# Patient Record
Sex: FEMALE | Race: WHITE | Employment: FULL TIME | URBAN - METROPOLITAN AREA
[De-identification: names, ages, dates, MRNs, and addresses within clinical notes are randomized per-mention and may not be internally consistent; named-entity substitution may affect disease eponyms.]

---

## 2023-01-04 ENCOUNTER — ANESTHESIA EVENT (OUTPATIENT)
Dept: SURGERY | Age: 18
End: 2023-01-04
Payer: COMMERCIAL

## 2023-01-05 ENCOUNTER — HOSPITAL ENCOUNTER (OUTPATIENT)
Age: 18
Setting detail: OUTPATIENT SURGERY
Discharge: HOME | End: 2023-01-05
Attending: SPECIALIST | Admitting: SPECIALIST
Payer: COMMERCIAL

## 2023-01-05 ENCOUNTER — ANESTHESIA (OUTPATIENT)
Dept: SURGERY | Age: 18
End: 2023-01-05
Payer: COMMERCIAL

## 2023-01-05 VITALS
DIASTOLIC BLOOD PRESSURE: 71 MMHG | OXYGEN SATURATION: 96 % | BODY MASS INDEX: 28.27 KG/M2 | RESPIRATION RATE: 13 BRPM | HEIGHT: 64 IN | TEMPERATURE: 97.8 F | HEART RATE: 97 BPM | WEIGHT: 165.57 LBS | SYSTOLIC BLOOD PRESSURE: 109 MMHG

## 2023-01-05 DIAGNOSIS — J35.01 CHRONIC TONSILLITIS: Primary | ICD-10-CM

## 2023-01-05 LAB — HCG UR QL: NEGATIVE

## 2023-01-05 PROCEDURE — 77030040361 HC SLV COMPR DVT MDII -B

## 2023-01-05 PROCEDURE — 77030026438 HC STYL ET INTUB CARD -A: Performed by: ANESTHESIOLOGY

## 2023-01-05 PROCEDURE — 74011000250 HC RX REV CODE- 250: Performed by: NURSE ANESTHETIST, CERTIFIED REGISTERED

## 2023-01-05 PROCEDURE — 2709999900 HC NON-CHARGEABLE SUPPLY: Performed by: SPECIALIST

## 2023-01-05 PROCEDURE — 74011000250 HC RX REV CODE- 250: Performed by: SPECIALIST

## 2023-01-05 PROCEDURE — 76060000062 HC AMB SURG ANES 1 TO 1.5 HR: Performed by: SPECIALIST

## 2023-01-05 PROCEDURE — 81025 URINE PREGNANCY TEST: CPT

## 2023-01-05 PROCEDURE — 76210000040 HC AMBSU PH I REC FIRST 0.5 HR: Performed by: SPECIALIST

## 2023-01-05 PROCEDURE — 74011250636 HC RX REV CODE- 250/636: Performed by: ANESTHESIOLOGY

## 2023-01-05 PROCEDURE — 74011250637 HC RX REV CODE- 250/637: Performed by: ANESTHESIOLOGY

## 2023-01-05 PROCEDURE — 77030040922 HC BLNKT HYPOTHRM STRY -A

## 2023-01-05 PROCEDURE — 76210000057 HC AMBSU PH II REC 1 TO 1.5 HR: Performed by: SPECIALIST

## 2023-01-05 PROCEDURE — 77030013079 HC BLNKT BAIR HGGR 3M -A: Performed by: ANESTHESIOLOGY

## 2023-01-05 PROCEDURE — 76030000001 HC AMB SURG OR TIME 1 TO 1.5: Performed by: SPECIALIST

## 2023-01-05 PROCEDURE — 74011250636 HC RX REV CODE- 250/636: Performed by: NURSE ANESTHETIST, CERTIFIED REGISTERED

## 2023-01-05 PROCEDURE — 77030014153 HC WND COBLATN ENT S&N -C: Performed by: SPECIALIST

## 2023-01-05 PROCEDURE — 77030008683 HC TU ET CUF COVD -A: Performed by: ANESTHESIOLOGY

## 2023-01-05 RX ORDER — SUCCINYLCHOLINE CHLORIDE 20 MG/ML
INJECTION INTRAMUSCULAR; INTRAVENOUS AS NEEDED
Status: DISCONTINUED | OUTPATIENT
Start: 2023-01-05 | End: 2023-01-05 | Stop reason: HOSPADM

## 2023-01-05 RX ORDER — ROCURONIUM BROMIDE 10 MG/ML
INJECTION, SOLUTION INTRAVENOUS AS NEEDED
Status: DISCONTINUED | OUTPATIENT
Start: 2023-01-05 | End: 2023-01-05 | Stop reason: HOSPADM

## 2023-01-05 RX ORDER — ONDANSETRON 2 MG/ML
INJECTION INTRAMUSCULAR; INTRAVENOUS AS NEEDED
Status: DISCONTINUED | OUTPATIENT
Start: 2023-01-05 | End: 2023-01-05 | Stop reason: HOSPADM

## 2023-01-05 RX ORDER — DEXAMETHASONE SODIUM PHOSPHATE 4 MG/ML
INJECTION, SOLUTION INTRA-ARTICULAR; INTRALESIONAL; INTRAMUSCULAR; INTRAVENOUS; SOFT TISSUE AS NEEDED
Status: DISCONTINUED | OUTPATIENT
Start: 2023-01-05 | End: 2023-01-05 | Stop reason: HOSPADM

## 2023-01-05 RX ORDER — MIDAZOLAM HYDROCHLORIDE 1 MG/ML
INJECTION, SOLUTION INTRAMUSCULAR; INTRAVENOUS AS NEEDED
Status: DISCONTINUED | OUTPATIENT
Start: 2023-01-05 | End: 2023-01-05 | Stop reason: HOSPADM

## 2023-01-05 RX ORDER — SCOLOPAMINE TRANSDERMAL SYSTEM 1 MG/1
1 PATCH, EXTENDED RELEASE TRANSDERMAL
Status: DISCONTINUED | OUTPATIENT
Start: 2023-01-05 | End: 2023-01-05 | Stop reason: HOSPADM

## 2023-01-05 RX ORDER — SODIUM CHLORIDE, SODIUM LACTATE, POTASSIUM CHLORIDE, CALCIUM CHLORIDE 600; 310; 30; 20 MG/100ML; MG/100ML; MG/100ML; MG/100ML
125 INJECTION, SOLUTION INTRAVENOUS CONTINUOUS
Status: DISCONTINUED | OUTPATIENT
Start: 2023-01-05 | End: 2023-01-05 | Stop reason: HOSPADM

## 2023-01-05 RX ORDER — METOCLOPRAMIDE HYDROCHLORIDE 5 MG/ML
10 INJECTION INTRAMUSCULAR; INTRAVENOUS ONCE
Status: COMPLETED | OUTPATIENT
Start: 2023-01-05 | End: 2023-01-05

## 2023-01-05 RX ORDER — ONDANSETRON 4 MG/1
4 TABLET, ORALLY DISINTEGRATING ORAL
Qty: 12 TABLET | Refills: 1 | Status: SHIPPED | OUTPATIENT
Start: 2023-01-05

## 2023-01-05 RX ORDER — SODIUM CHLORIDE, SODIUM LACTATE, POTASSIUM CHLORIDE, CALCIUM CHLORIDE 600; 310; 30; 20 MG/100ML; MG/100ML; MG/100ML; MG/100ML
100 INJECTION, SOLUTION INTRAVENOUS CONTINUOUS
Status: DISCONTINUED | OUTPATIENT
Start: 2023-01-05 | End: 2023-01-05 | Stop reason: HOSPADM

## 2023-01-05 RX ORDER — FENTANYL CITRATE 50 UG/ML
INJECTION, SOLUTION INTRAMUSCULAR; INTRAVENOUS AS NEEDED
Status: DISCONTINUED | OUTPATIENT
Start: 2023-01-05 | End: 2023-01-05 | Stop reason: HOSPADM

## 2023-01-05 RX ORDER — OXYCODONE AND ACETAMINOPHEN 5; 325 MG/1; MG/1
1 TABLET ORAL
Qty: 24 TABLET | Refills: 0 | Status: SHIPPED | OUTPATIENT
Start: 2023-01-05 | End: 2023-01-10

## 2023-01-05 RX ORDER — BUPIVACAINE HYDROCHLORIDE AND EPINEPHRINE 5; 5 MG/ML; UG/ML
INJECTION, SOLUTION EPIDURAL; INTRACAUDAL; PERINEURAL AS NEEDED
Status: DISCONTINUED | OUTPATIENT
Start: 2023-01-05 | End: 2023-01-05 | Stop reason: HOSPADM

## 2023-01-05 RX ORDER — DEXMEDETOMIDINE HYDROCHLORIDE 100 UG/ML
INJECTION, SOLUTION INTRAVENOUS AS NEEDED
Status: DISCONTINUED | OUTPATIENT
Start: 2023-01-05 | End: 2023-01-05 | Stop reason: HOSPADM

## 2023-01-05 RX ORDER — FAMOTIDINE 10 MG/ML
20 INJECTION INTRAVENOUS ONCE
Status: COMPLETED | OUTPATIENT
Start: 2023-01-05 | End: 2023-01-05

## 2023-01-05 RX ORDER — PROPOFOL 10 MG/ML
INJECTION, EMULSION INTRAVENOUS AS NEEDED
Status: DISCONTINUED | OUTPATIENT
Start: 2023-01-05 | End: 2023-01-05 | Stop reason: HOSPADM

## 2023-01-05 RX ORDER — PROPOFOL 10 MG/ML
INJECTION, EMULSION INTRAVENOUS
Status: DISCONTINUED | OUTPATIENT
Start: 2023-01-05 | End: 2023-01-05 | Stop reason: HOSPADM

## 2023-01-05 RX ORDER — LIDOCAINE HYDROCHLORIDE 20 MG/ML
INJECTION, SOLUTION EPIDURAL; INFILTRATION; INTRACAUDAL; PERINEURAL AS NEEDED
Status: DISCONTINUED | OUTPATIENT
Start: 2023-01-05 | End: 2023-01-05 | Stop reason: HOSPADM

## 2023-01-05 RX ORDER — DIPHENHYDRAMINE HYDROCHLORIDE 50 MG/ML
12.5 INJECTION, SOLUTION INTRAMUSCULAR; INTRAVENOUS AS NEEDED
Status: DISCONTINUED | OUTPATIENT
Start: 2023-01-05 | End: 2023-01-05 | Stop reason: HOSPADM

## 2023-01-05 RX ORDER — HYDROMORPHONE HYDROCHLORIDE 1 MG/ML
.25-1 INJECTION, SOLUTION INTRAMUSCULAR; INTRAVENOUS; SUBCUTANEOUS
Status: DISCONTINUED | OUTPATIENT
Start: 2023-01-05 | End: 2023-01-05 | Stop reason: HOSPADM

## 2023-01-05 RX ADMIN — METOCLOPRAMIDE 10 MG: 5 INJECTION, SOLUTION INTRAMUSCULAR; INTRAVENOUS at 07:38

## 2023-01-05 RX ADMIN — PROPOFOL 200 MG: 10 INJECTION, EMULSION INTRAVENOUS at 09:19

## 2023-01-05 RX ADMIN — ONDANSETRON HYDROCHLORIDE 4 MG: 2 SOLUTION INTRAMUSCULAR; INTRAVENOUS at 09:36

## 2023-01-05 RX ADMIN — DEXMEDETOMIDINE HCL 6 MCG: 100 INJECTION INTRAVENOUS at 09:31

## 2023-01-05 RX ADMIN — MIDAZOLAM HYDROCHLORIDE 2 MG: 1 INJECTION, SOLUTION INTRAMUSCULAR; INTRAVENOUS at 09:11

## 2023-01-05 RX ADMIN — LIDOCAINE HYDROCHLORIDE 60 MG: 20 INJECTION, SOLUTION INTRAVENOUS at 09:19

## 2023-01-05 RX ADMIN — PROPOFOL 50 MG: 10 INJECTION, EMULSION INTRAVENOUS at 09:30

## 2023-01-05 RX ADMIN — FENTANYL CITRATE 50 MCG: 50 INJECTION, SOLUTION INTRAMUSCULAR; INTRAVENOUS at 09:19

## 2023-01-05 RX ADMIN — ROCURONIUM BROMIDE 5 MG: 10 INJECTION INTRAVENOUS at 09:18

## 2023-01-05 RX ADMIN — PROPOFOL 20 MG: 10 INJECTION, EMULSION INTRAVENOUS at 09:41

## 2023-01-05 RX ADMIN — FENTANYL CITRATE 50 MCG: 50 INJECTION, SOLUTION INTRAMUSCULAR; INTRAVENOUS at 09:17

## 2023-01-05 RX ADMIN — FENTANYL CITRATE 25 MCG: 50 INJECTION, SOLUTION INTRAMUSCULAR; INTRAVENOUS at 09:43

## 2023-01-05 RX ADMIN — FENTANYL CITRATE 25 MCG: 50 INJECTION, SOLUTION INTRAMUSCULAR; INTRAVENOUS at 09:45

## 2023-01-05 RX ADMIN — SODIUM CHLORIDE, POTASSIUM CHLORIDE, SODIUM LACTATE AND CALCIUM CHLORIDE 100 ML/HR: 600; 310; 30; 20 INJECTION, SOLUTION INTRAVENOUS at 07:36

## 2023-01-05 RX ADMIN — SUCCINYLCHOLINE CHLORIDE 100 MG: 20 INJECTION, SOLUTION INTRAMUSCULAR; INTRAVENOUS at 09:20

## 2023-01-05 RX ADMIN — DEXMEDETOMIDINE HCL 4 MCG: 100 INJECTION INTRAVENOUS at 09:11

## 2023-01-05 RX ADMIN — FAMOTIDINE 20 MG: 10 INJECTION, SOLUTION INTRAVENOUS at 07:38

## 2023-01-05 RX ADMIN — PROPOFOL 25 MCG/KG/MIN: 10 INJECTION, EMULSION INTRAVENOUS at 09:23

## 2023-01-05 RX ADMIN — DEXAMETHASONE SODIUM PHOSPHATE 8 MG: 4 INJECTION, SOLUTION INTRAMUSCULAR; INTRAVENOUS at 09:23

## 2023-01-05 NOTE — ANESTHESIA POSTPROCEDURE EVALUATION
Procedure(s):  TONSILLECTOMY (GENERAL ET). general    Anesthesia Post Evaluation      Multimodal analgesia: multimodal analgesia used between 6 hours prior to anesthesia start to PACU discharge  Patient location during evaluation: bedside  Patient participation: complete - patient participated  Level of consciousness: awake and sleepy but conscious  Pain score: 2  Pain management: adequate  Airway patency: patent  Anesthetic complications: no  Cardiovascular status: acceptable  Respiratory status: acceptable  Hydration status: acceptable  Comments: Immediate cv/pulm status within acceptable preop limits. Post anesthesia nausea and vomiting:  controlled  Final Post Anesthesia Temperature Assessment:  Normothermia (36.0-37.5 degrees C)      INITIAL Post-op Vital signs:   Vitals Value Taken Time   /70 01/05/23 1035   Temp 36.3 °C (97.4 °F) 01/05/23 1024   Pulse 87 01/05/23 1039   Resp 15 01/05/23 1039   SpO2 98 % 01/05/23 1039   Vitals shown include unvalidated device data.

## 2023-01-05 NOTE — ANESTHESIA PREPROCEDURE EVALUATION
Relevant Problems   No relevant active problems       Anesthetic History     PONV          Review of Systems / Medical History  Patient summary reviewed, nursing notes reviewed and pertinent labs reviewed    Pulmonary  Within defined limits                 Neuro/Psych   Within defined limits           Cardiovascular  Within defined limits                Exercise tolerance: >4 METS  Comments: Denied recent cv/pulm complaints or changes. GI/Hepatic/Renal  Within defined limits             Comments: Denied active reflux symptoms Endo/Other  Within defined limits           Other Findings            Physical Exam    Airway  Mallampati: I  TM Distance: > 6 cm  Neck ROM: normal range of motion   Mouth opening: Normal     Cardiovascular  Regular rate and rhythm,  S1 and S2 normal,  no murmur, click, rub, or gallop  Rhythm: regular  Rate: normal         Dental      Comments: No loose teeth.    Pulmonary  Breath sounds clear to auscultation               Abdominal  Abdominal exam normal       Other Findings            Anesthetic Plan    ASA: 1  Anesthesia type: general          Induction: Intravenous  Anesthetic plan and risks discussed with: Patient and Family

## 2023-01-05 NOTE — PERIOP NOTES
Dr Verneita Dakins to bedside to see pt. This RN out to review D/C with mom, questions/concerns addressed. Pt resting, awakens to voice, denies pain, VSS, mom made aware of observational time required post T&A, verbalized understanding.

## 2023-01-05 NOTE — H&P
Otolaryngology - Head & Neck Surgery    Subjective:     History of Present Illness:   Courtney Zamudio is a 21 y.o. female with a history of recurrent strep throat and chronic tonsillitis presents today for tonsillectomy. History reviewed. No pertinent past medical history. Past Surgical History:   Procedure Laterality Date    HX UROLOGICAL  2003      History reviewed. No pertinent family history. Social History     Tobacco Use    Smoking status: Not on file    Smokeless tobacco: Not on file   Substance Use Topics    Alcohol use: Not on file     No Known Allergies  Prior to Admission medications    Not on File        Review of Systems    Objective:     Patient Vitals for the past 8 hrs:   BP Temp Pulse Resp SpO2 Height Weight   23 0733 118/77 98.9 °F (37.2 °C) (!) 102 18 95 % 5' 3.5\" (1.613 m) 75.1 kg (165 lb 9.1 oz)     Temp (24hrs), Av.9 °F (37.2 °C), Min:98.9 °F (37.2 °C), Max:98.9 °F (37.2 °C)    No intake/output data recorded. Physical Exam    NAD  3+ tonsils, non-inflamed. Neck - no mass. CV - lungs clear. RRR. There were no additional components assessed. Assessment:     Chronic tonsillitis, hypertrophy, recurrent strep. Will proceed with tonsillectomy. Risks and benefits reviewed, consent obtained. Plan:     As above.     Signed By:  Mark Bermeo MD     2023

## 2023-01-05 NOTE — OP NOTES
Operative Report    NAME: Era Salmeron  MRN: [id number]  DATE: 1/5/2023      PREOPERATIVE DIAGNOSIS: CHRONIC TONSILLITIS  POSTOPERATIVE DIAGNOSIS: CHRONIC TONSILLITIS    PROCEDURES PERFORMED:  Tonsillectomy  SURGEON: Cristiane Tamayo MD  ASSISTANT: None. ANESTHESIA: General  COMPLICATIONS:  None  BLOOD LOSS:  Minimal, about 5 cc. IMPLANTS: None. FINDINGS: 3+ palatine tonsils, chronically-inflamed. Adenoids atrophic and not removed. INDICATIONS FOR SURGERY:  The patient is a 21 y.o. female with a history of recurrent tonsillitis as well as low-grade throat discomfort and tonsil stones presents today for tonsillectomy. PROCEDURE DETAILS:  After informed consent, the patient was taken to the operating room and placed on the table in the supine position. General anesthesia was administered via face mask and then the patient was orotracheally intubated without event. The table was rotated 90 degrees and the patient was draped in the standard fashion for tonsillectomy surgery. A Julia-Asif mouth retractor was inserted into the mouth, opened, and suspended from the Juarez stand. A solution of 0.5% bupivicaine with epinephrine was injected into the anterior tonsillar pillars bilaterally taking care to avoid intravascular injection. A red rubber catheter was inserted into the nasal cavity and brought back out through the oral cavity for retraction of the soft palate and uvula. Using an curved Allis clamp, the right tonsil was retracted toward the midline. The Coblator, at the appropriate setting, was used to enter the tonsillar capsule through the anterior pillar and the capsule was followed around the tonsil while retracting the tonsil medially to save the posterior pillar mucosa. Care was taken to preserve as much normal tonsillar pillar mucosa as possible. Any bleeding was controlled with the coagulation function of the Coblator.     After removal of the right tonsil, suspension was released to allow for tongue reperfusion and repositioning. Suspension was then re-applied and our attention was directed toward the left palatine tonsil. The left tonsil was removed in the same fashion as the adjacent right side. Conservative prophylactic cautery was performed in both poles bilaterally. There was no bleeding seen at this point. At this point the oropharynx was irrigated with normal saline. A small amount of bupivocaine 0.5% was injected into the superior aspects of the anterior tonsillar pillars bilaterally. Irrigation was again performed and any residual saline and blood was evacuated from the oropharynx with a Yankauer suction. The mouth retractor was taken down for a short period and then re-suspended. No bleeding was seen at this point. Therefore, the patient was then awakened from anesthesia, extubated, and taken to the PACU in stable condition. There were no complications. The patient tolerated the procedure well.     Lynsey Rasmussen MD  1/5/2023  10:02 AM

## 2023-01-05 NOTE — DISCHARGE INSTRUCTIONS
Tonsillectomy Patient Discharge Instructions     Most patients require 10 to 12 days to heal from tonsillectomy surgery. Some may heal quicker, occasionally feeling better by day 4 or 5, while others may take 2 to 3 weeks for a full recovery. Below are some important tips to make the recovery as smooth as possible. Never hesitate to call the office if any questions or concerns arise. 1. Drink plenty of fluids. The most important requirement for the patient is to drink plenty of fluids. Using a straw is OK. Milk products should generally be avoided for the first 24 hrs following anesthesia as they may upset the stomach. Water, juice, Gatorade and soft drinks are generally well tolerated. Avoid citric drinks such as orange or grapefruit juice as they may irritate the throat and increase discomfort. Signs of dehydration include decreased urination (less than 2-3 times per day), dry mouth or crying without tears. Please contact your physician if any concerns arise. Rarely, intravenous fluids may need to be administered to avoid dehydration. Minimal fluid intake over 24 hours:       Weight    Minimal fluid intake       Over 20 lbs  34 oz       Over 30 lbs  42 oz       Over 40 lbs  50 oz       Over 50 lbs  58 oz       Over 60 lbs  68 oz    2. Eat soothing foods. For the first 10 days after tonsil surgery, generally softer foods feel better. A normal diet should be OK but sometimes really hard or 'sharp' foods may result in some throat pain or irritation. Foods that are easy on the throat include applesauce, yogurt, cooked cereals, broths or soups, mashed potatoes and soft fruits. Cold foods such as popsicles, [place] ice or frozen yogurt may also be soothing to the throat. Avoid very hot, spicy or acidic foods (ie, tomato sauce, orange juice). For older children and adults, chewing gum can be helpful to alleviate muscle tightness in the jaw and face. 3. Pain.  By far, pain is the chief issue following surgery. Expect throat pain that may peak between day 3 and day 7 following the procedure. The pain may also be present in the ears, jaw, tongue and neck. Prescription pain medication, such as oxycodone/acetaminophen, may be prescribed (we try to avoid narcotic use in children under the age of 3). If the pain is mild to moderate, alternating between ibuprofen (Motrin) and acetaminophen (Tylenol) may be adequate to control the pain. Alternating every 3 hours between these two medications (ie., Tylenol at 12 noon, ibuprofen at 3 pm, Tylenol at 6 pm, ibuprofen at 9 pm) is a good way of controlling the pain. If the prescription medication is given, OTC Tylenol should be avoided as they both may contain acetaminophen. 4. Gargle with warm salt water. For older children and adults, gargling several times a day with luke warm salt water may be helpful. Mix 8 ounces of warm water with about ¼ teaspoon of table salt. Make sure to have your child spit the solution out after gently gargling. 5. Stay away from irritants. Try to avoid potential irritants such as cigarette smoke, fumes from paints or other chemicals which may not only increase the pain but also delay healing. 6. Use a humidifier. Adding moisture to the air can be helpful. Keeping the mucous membranes moist can help ease the discomfort following tonsillectomy surgery. A cool mist humidifier strategically placed near the bedside is ideal.    7. Fever. A low-grade fever is common for several days following a tonsillectomy. Call your physician if a fever if greater than 102 degrees is present over several hours. 8. Bleeding. Bleeding after tonsil surgery occurs less than 5% of the time, usually within the first 6-8 days. Small specks of blood or blood-tinged saliva may be seen for several days following the procedure and of no major concern.  If bright red blood, large clots or bloody vomit is present, best initial course of action is to gargle with ice cold water or make a slushy (in ) with ice and water and gargle/swish around back of throat for 20-30 minutes. Please stay calm but call the physician or go to the emergency room if the bleeding does not stop. Scabs that form will often fall off after 7 to 10 days and this may result in some brief bleeding. 9. Activity. Rest is recommended for the first few days, followed by slowly increased activity. It is important to avoid heavy lifting (over 25-30 pounds) or hard straining for about 10 days after surgery. If diet has resumed to near-normal and pain medication is no longer necessary, the patient may return to work or school. Talking is fine after surgery. Bathing is also not a problem. 10. Miscellaneous. The tongue or uvula may be swollen, red or bumpy for several days. Also, the back of the throat where the tonsils were will form yellow scabs. This is completely normal and not a sign of acute infection. The scabs will often fall off or disappear after 1-3 weeks and more normal-appearing tissue will grow in its place. The voice may be different for a few weeks. Snoring may actually be worse for a week or two until the swelling goes down. Taste can be affected as well. Often taste will be blunted or 'different' for several weeks after tonsil surgery. This almost always returns to normal after a couple of months. Constipation can also be a problem after tonsil surgery due to the alteration in diet, pain and narcotic medication use. Please use an OTC stool softener/laxative if this is thought to be a potential issue. Finally, it is not uncommon for liquids to reflux into the nose when drinking. This should subside after 3-4 weeks. Follow-up in approximately 3 weeks, sooner if problems or concerns arise. If any appointment has not been made already, please call the office to schedule.      Office Phone:  [phone]     DISCHARGE SUMMARY from your Nurse    The following personal items collected during your admission are returned to you:   Dental Appliance: Dental Appliances: None  Vision: Visual Aid: None  Hearing Aid:    Jewelry: Jewelry: None  Clothing: Clothing: Pants, Shirt, Undergarments, Footwear, Socks  Other Valuables: Other Valuables: None  Valuables sent to safe:      PATIENT INSTRUCTIONS:    After general anesthesia or intravenous sedation, for 24 hours or while taking prescription Narcotics:  Limit your activities  Do not drive and operate hazardous machinery  Do not make important personal or business decisions  Do  not drink alcoholic beverages  If you have not urinated within 8 hours after discharge, please contact your surgeon on call. Report the following to your surgeon:  Excessive pain, swelling, redness or odor of or around the surgical area  Temperature over 100.5  Nausea and vomiting lasting longer than 4 hours or if unable to take medications  Any signs of decreased circulation or nerve impairment to extremity: change in color, persistent  numbness, tingling, coldness or increase pain  Any questions    COUGH AND DEEP BREATHE    Breathing deep and coughing are very important exercises to do after surgery. Deep breathing and coughing open the little air tubes and air sacks in your lungs. You take deep breaths every day. You may not even notice - it is just something you do when you sigh or yawn. It is a natural exercise you do to keep these air passages open. After surgery, take deep breaths and cough, on purpose. Coughing and deep breathing help prevent bronchitis and pneumonia after surgery. If you had chest or belly surgery, use a pillow as a \"hug buddy\" and hold it tightly to your chest or belly when you cough. DIRECTIONS:  Take 10 to 15 slow deep breaths every hour while awake. Breathe in deeply, and hold it for 2 seconds. Exhale slowly through puckered lips, like blowing up a balloon.   After every 4th or 5th deep breath, hug your pillow to your chest or belly and give a hard, deep cough. Yes, it will probably hurt. But doing this exercise is very important part of healing after surgery. Take your pain medicine to help you do this exercise without too much pain. IF YOU HAVE BEEN DIAGNOSED WITH SLEEP APNEA, PLEASE USE YOUR SLEEP APNEA DEVICE OR CPAP MACHINE WHEN YOU INTEND TO NAP AFTER TAKING PAIN MEDICATION. Ankle Pumps    Ankle pumps increase the circulation of oxygenated blood to your lower extremities and decrease your risk for circulation problems such as blood clots. They also stretch the muscles, tendons and ligaments in your foot and ankle, and prevent joint contracture in the ankle and foot, especially after surgeries on the legs. It is important to do ankle pump exercises regularly after surgery because immobility increases your risk for developing a blood clot. Your doctor may also have you take an Aspirin for the next few days as well. If your doctor did not ask you to take an Aspirin, consult with him before starting Aspirin therapy on your own. Slowly point your foot forward, feeling the muscles on the top of your lower leg stretch, and hold this position for 5 seconds. Next, pull your foot back toward you as far as possible, stretching the calf muscles, and hold that position for 5 seconds. Repeat with the other foot. Perform 10 repetitions every hour while awake for both ankles if possible (down and then up with the foot once is one repetition). You should feel gentle stretching of the muscles in your lower leg when doing this exercise. If you feel pain, or your range of motion is limited, don't  Push too hard. Only go the limit your joint and muscles will let you go. If you have increasing pain, progressively worsening leg warmth or swelling, STOP the exercise and call your doctor.      Below is information about the medications your doctor is prescribing after your visit:    Other information in your discharge envelope:  []     PRESCRIPTIONS  []     SCHOOL/WORK NOTE  []     INFECTION PREVENTION  []     REGIONAL NERVE BLOCK PAMPHLET  []     REGIONAL NERVE BLOCK ON QUE PAMPHLET   []     EXPAREL  []     HANDICAP APPLICATION         These are general instructions for a healthy lifestyle:    *  Please give a list of your current medications to your Primary Care Provider. *  Please update this list whenever your medications are discontinued, doses are      changed, or new medications (including over-the-counter products) are added. *  Please carry medication information at all times in case of emergency situations. About Smoking  No smoking / No tobacco products / Avoid exposure to second hand smoke    Surgeon General's Warning:  Quitting smoking now greatly reduces serious risk to your health. Obesity, smoking, and sedentary lifestyle greatly increases your risk for illness and disease. A healthy diet, regular physical exercise & weight monitoring are important for maintaining a healthy lifestyle. Congestive Heart Failure  You may be retaining fluid if you have a history of heart failure or if you experience any of the following symptoms:  Weight gain of 3 pounds or more overnight or 5 pounds in a week, increased swelling in our hands or feet or shortness of breath while lying flat in bed. Please call your doctor as soon as you notice any of these symptoms; do not wait until your next office visit. Recognize signs and symptoms of STROKE:  F - face looks uneven  A - arms unable to move or move even  S - speech slurred or non-existent  T - time-call 911 as soon as signs and symptoms begin-DO NOT go         Back to bed or wait to see if you get better-TIME IS BRAIN. Warning signs of HEART ATTACK  Call 911 if you have these symptoms    Chest discomfort.   Most heart attacks involve discomfort in the center of the chest that lasts more than a few minutes, or that goes away and comes back. It can feel like uncomfortable pressure, squeezing, fullness, or pain. Discomfort in other areas of the upper body. Symptoms can include pain or discomfort in one or both        Arms, the back, neck, jaw, or stomach. Shortness of breath with or without chest discomfort. Other signs may include breaking out in a cold sweat, nausea, or lightheadedness    Don't wait more than five minutes to call 911 - MINUTES MATTER! Fast action can save your life. Calling 911 is almost always the fastest way to get lifesaving treatment. Emergency Medical Services staff can begin treatment when they arrive - up to an hour sooner than if someone gets to the hospital by car. [facility] MEDICATION AND SIDE EFFECT GUIDE    The [address] MEDICATION AND SIDE EFFECT GUIDE was provided to the PATIENT AND CARE PROVIDER.   Information provided includes instruction about drug purpose and common side effects for the following medications:    Zofran  Percocet

## (undated) DEVICE — CATHETER,URETHRAL,REDRUBBER,STERILE,8FR: Brand: MEDLINE

## (undated) DEVICE — CANISTER, RIGID, 3000CC: Brand: MEDLINE INDUSTRIES, INC.

## (undated) DEVICE — TONSIL-SFMCASU: Brand: MEDLINE INDUSTRIES, INC.

## (undated) DEVICE — EVAC 70 XTRA WAND: Brand: COBLATION

## (undated) DEVICE — Device

## (undated) DEVICE — GLOVE ORANGE PI 7 1/2   MSG9075

## (undated) DEVICE — SOLUTION IRRIG 1000ML 0.9% SOD CHL USP POUR PLAS BTL

## (undated) DEVICE — TUBING, SUCTION, 1/4" X 10', STRAIGHT: Brand: MEDLINE